# Patient Record
Sex: FEMALE | Race: WHITE | NOT HISPANIC OR LATINO | Employment: FULL TIME | URBAN - METROPOLITAN AREA
[De-identification: names, ages, dates, MRNs, and addresses within clinical notes are randomized per-mention and may not be internally consistent; named-entity substitution may affect disease eponyms.]

---

## 2017-07-11 ENCOUNTER — HOSPITAL ENCOUNTER (EMERGENCY)
Facility: HOSPITAL | Age: 27
Discharge: HOME | End: 2017-07-12
Attending: EMERGENCY MEDICINE
Payer: COMMERCIAL

## 2017-07-11 DIAGNOSIS — O03.9 SPONTANEOUS MISCARRIAGE: ICD-10-CM

## 2017-07-11 DIAGNOSIS — R10.2 ACUTE PELVIC PAIN, FEMALE: Primary | ICD-10-CM

## 2017-07-11 DIAGNOSIS — N83.209 OVARIAN CYST: ICD-10-CM

## 2017-07-11 DIAGNOSIS — N93.9 VAGINAL BLEEDING: ICD-10-CM

## 2017-07-11 LAB
ALBUMIN SERPL BCP-MCNC: 3.8 G/DL
ALP SERPL-CCNC: 41 U/L
ALT SERPL W/O P-5'-P-CCNC: 19 U/L
ANION GAP SERPL CALC-SCNC: 7 MMOL/L
AST SERPL-CCNC: 20 U/L
BASOPHILS # BLD AUTO: 0.01 K/UL
BASOPHILS NFR BLD: 0.2 %
BILIRUB SERPL-MCNC: 0.3 MG/DL
BUN SERPL-MCNC: 10 MG/DL
CALCIUM SERPL-MCNC: 9 MG/DL
CHLORIDE SERPL-SCNC: 106 MMOL/L
CO2 SERPL-SCNC: 25 MMOL/L
CREAT SERPL-MCNC: 0.7 MG/DL
DIFFERENTIAL METHOD: ABNORMAL
EOSINOPHIL # BLD AUTO: 0.1 K/UL
EOSINOPHIL NFR BLD: 0.9 %
ERYTHROCYTE [DISTWIDTH] IN BLOOD BY AUTOMATED COUNT: 12.2 %
EST. GFR  (AFRICAN AMERICAN): >60 ML/MIN/1.73 M^2
EST. GFR  (NON AFRICAN AMERICAN): >60 ML/MIN/1.73 M^2
GLUCOSE SERPL-MCNC: 86 MG/DL
HCT VFR BLD AUTO: 35.3 %
HGB BLD-MCNC: 12.5 G/DL
LYMPHOCYTES # BLD AUTO: 2 K/UL
LYMPHOCYTES NFR BLD: 31.8 %
MCH RBC QN AUTO: 33.1 PG
MCHC RBC AUTO-ENTMCNC: 35.4 %
MCV RBC AUTO: 93 FL
MONOCYTES # BLD AUTO: 0.3 K/UL
MONOCYTES NFR BLD: 5.4 %
NEUTROPHILS # BLD AUTO: 3.9 K/UL
NEUTROPHILS NFR BLD: 61.7 %
PLATELET # BLD AUTO: 150 K/UL
PMV BLD AUTO: 10.7 FL
POTASSIUM SERPL-SCNC: 3.7 MMOL/L
PROT SERPL-MCNC: 6.8 G/DL
RBC # BLD AUTO: 3.78 M/UL
SODIUM SERPL-SCNC: 138 MMOL/L
WBC # BLD AUTO: 6.35 K/UL

## 2017-07-11 PROCEDURE — 85025 COMPLETE CBC W/AUTO DIFF WBC: CPT

## 2017-07-11 PROCEDURE — 84702 CHORIONIC GONADOTROPIN TEST: CPT

## 2017-07-11 PROCEDURE — 25000003 PHARM REV CODE 250: Performed by: NURSE PRACTITIONER

## 2017-07-11 PROCEDURE — 96365 THER/PROPH/DIAG IV INF INIT: CPT

## 2017-07-11 PROCEDURE — 96375 TX/PRO/DX INJ NEW DRUG ADDON: CPT

## 2017-07-11 PROCEDURE — 80053 COMPREHEN METABOLIC PANEL: CPT

## 2017-07-11 PROCEDURE — 63600175 PHARM REV CODE 636 W HCPCS: Performed by: NURSE PRACTITIONER

## 2017-07-11 PROCEDURE — 99285 EMERGENCY DEPT VISIT HI MDM: CPT | Mod: 25

## 2017-07-11 RX ORDER — SODIUM CHLORIDE 9 MG/ML
125 INJECTION, SOLUTION INTRAVENOUS CONTINUOUS
Status: DISCONTINUED | OUTPATIENT
Start: 2017-07-11 | End: 2017-07-11

## 2017-07-11 RX ORDER — MORPHINE SULFATE 2 MG/ML
2 INJECTION, SOLUTION INTRAMUSCULAR; INTRAVENOUS
Status: COMPLETED | OUTPATIENT
Start: 2017-07-11 | End: 2017-07-11

## 2017-07-11 RX ADMIN — MORPHINE SULFATE 2 MG: 2 INJECTION, SOLUTION INTRAMUSCULAR; INTRAVENOUS at 10:07

## 2017-07-11 RX ADMIN — PROMETHAZINE HYDROCHLORIDE 25 MG: 25 INJECTION INTRAMUSCULAR; INTRAVENOUS at 10:07

## 2017-07-12 VITALS
RESPIRATION RATE: 18 BRPM | TEMPERATURE: 98 F | HEIGHT: 65 IN | HEART RATE: 61 BPM | BODY MASS INDEX: 21.33 KG/M2 | OXYGEN SATURATION: 99 % | SYSTOLIC BLOOD PRESSURE: 101 MMHG | DIASTOLIC BLOOD PRESSURE: 79 MMHG | WEIGHT: 128 LBS

## 2017-07-12 LAB — HCG INTACT+B SERPL-ACNC: NORMAL MIU/ML

## 2017-07-12 RX ORDER — OXYCODONE AND ACETAMINOPHEN 5; 325 MG/1; MG/1
1 TABLET ORAL EVERY 4 HOURS PRN
Qty: 18 TABLET | Refills: 0 | Status: SHIPPED | OUTPATIENT
Start: 2017-07-12 | End: 2021-10-27

## 2017-07-12 RX ORDER — PROMETHAZINE HYDROCHLORIDE 25 MG/1
25 TABLET ORAL EVERY 6 HOURS PRN
Qty: 15 TABLET | Refills: 0 | Status: SHIPPED | OUTPATIENT
Start: 2017-07-12 | End: 2021-10-27

## 2017-07-12 NOTE — ED NOTES
The patient is resting quietly, eyes closed, arouses easily to stimuli. Airway is open and patent, respirations are spontaneous, normal respiratory effort and rate noted, skin warm and dry, appearance: in no acute distress and resting comfortably. Reports no complaints of pain or n/v at this time. VSS

## 2017-07-12 NOTE — ED PROVIDER NOTES
SCRIBE #1 NOTE: I, Susana Wilson, am scribing for, and in the presence of, Skyler Willis NP. I have scribed the entire note.      History      Chief Complaint   Patient presents with    Abdominal Pain     reports currently 8 weeks pregnant reports having ultrasound completed on Friday and was notified she had a large right ovarian cyst, also had + IUP, reports increased pain today with slight vaginal bleeding        Review of patient's allergies indicates:  No Known Allergies     HPI   HPI    7/11/2017, 10:12 PM   History obtained from the patient      History of Present Illness: Kelly Neal is a 26 y.o. female patient (8 weeks gestation) who presents to the Emergency Department for lower abd pain which onset gradually today. Symptoms are constant and moderate in severity. Pt describes pain as a pressure. Pt states that she currently has a 6 cm ovarian cyst. No mitigating or exacerbating factors reported. Associated sxs include vaginal bleeding. Patient denies any fever, n/v/d, constipation, vaginal pain, vaginal discharge, pelvic pain, flank pain, dysuria, hematuria, back pain, CP, SOB, HA, numbness, weakness, and all other sxs at this time. Prior Tx includes tylenol with no relief. No further complaints or concerns at this time.       Arrival mode: Personal vehicle    PCP: Raymundo Adler MD       Past Medical History:  Past medical history reviewed not relevant      Past Surgical History:  Past surgical history reviewed not relevant      Family History:  Family history reviewed not relevant      Social History:  Social History reviewed not relevant    Social History Main Topics    Social History Main Topics    Smoking status: Unknown if ever smoked    Smokeless tobacco: Unknown if ever used    Alcohol Use: Unknown drinking history    Drug Use: Unknown if ever used    Sexual Activity: Unknown       ROS   Review of Systems   Constitutional: Negative for fever.   HENT: Negative for sore throat.     Respiratory: Negative for shortness of breath.    Cardiovascular: Negative for chest pain.   Gastrointestinal: Positive for abdominal pain (lower). Negative for abdominal distention, blood in stool, constipation, diarrhea, nausea and vomiting.   Genitourinary: Positive for vaginal bleeding. Negative for decreased urine volume, difficulty urinating, dysuria, flank pain, hematuria, pelvic pain, vaginal discharge and vaginal pain.   Musculoskeletal: Negative for back pain.   Skin: Negative for rash.   Neurological: Negative for dizziness, weakness, light-headedness, numbness and headaches.   Hematological: Does not bruise/bleed easily.       Physical Exam      Initial Vitals [07/11/17 2201]   BP Pulse Resp Temp SpO2   128/69 66 18 98.1 °F (36.7 °C) 96 %      MAP       88.67          Physical Exam  Nursing Notes and Vital Signs Reviewed.  Constitutional: Patient is in no acute distress. Well-developed and well-nourished.  Head: Atraumatic. Normocephalic.  Eyes: PERRL. EOM intact. Conjunctivae are not pale. No scleral icterus.  ENT: Mucous membranes are moist. Oropharynx is clear and symmetric.    Neck: Supple. Full ROM. No lymphadenopathy.  Cardiovascular: Regular rate. Regular rhythm. No murmurs, rubs, or gallops. Distal pulses are 2+ and symmetric.  Pulmonary/Chest: No respiratory distress. Clear to auscultation bilaterally. No wheezing, rales, or rhonchi.  Abdominal: Soft and non-distended.  Right lower pelvic tenderness.  No rebound, guarding, or rigidity. Good bowel sounds.  Genitourinary: No CVA tenderness  Musculoskeletal: Moves all extremities. No obvious deformities. No edema. No calf tenderness.  Skin: Warm and dry.  Neurological:  Alert, awake, and appropriate.  Normal speech.  No acute focal neurological deficits are appreciated.  Psychiatric: Normal affect. Good eye contact. Appropriate in content.    ED Course    Procedures  ED Vital Signs:  Vitals:    07/11/17 2201 07/11/17 2345   BP: 128/69 101/79  "  Pulse: 66 61   Resp: 18 18   Temp: 98.1 °F (36.7 °C)    TempSrc: Oral    SpO2: 96% 99%   Weight: 58.1 kg (128 lb)    Height: 5' 5" (1.651 m)        Abnormal Lab Results:  Labs Reviewed   CBC W/ AUTO DIFFERENTIAL - Abnormal; Notable for the following:        Result Value    RBC 3.78 (*)     Hematocrit 35.3 (*)     MCH 33.1 (*)     All other components within normal limits   COMPREHENSIVE METABOLIC PANEL - Abnormal; Notable for the following:     Alkaline Phosphatase 41 (*)     Anion Gap 7 (*)     All other components within normal limits   HCG, QUANTITATIVE, PREGNANCY        All Lab Results:  Results for orders placed or performed during the hospital encounter of 07/11/17   CBC W/ AUTO DIFFERENTIAL   Result Value Ref Range    WBC 6.35 3.90 - 12.70 K/uL    RBC 3.78 (L) 4.00 - 5.40 M/uL    Hemoglobin 12.5 12.0 - 16.0 g/dL    Hematocrit 35.3 (L) 37.0 - 48.5 %    MCV 93 82 - 98 fL    MCH 33.1 (H) 27.0 - 31.0 pg    MCHC 35.4 32.0 - 36.0 %    RDW 12.2 11.5 - 14.5 %    Platelets 150 150 - 350 K/uL    MPV 10.7 9.2 - 12.9 fL    Gran # 3.9 1.8 - 7.7 K/uL    Lymph # 2.0 1.0 - 4.8 K/uL    Mono # 0.3 0.3 - 1.0 K/uL    Eos # 0.1 0.0 - 0.5 K/uL    Baso # 0.01 0.00 - 0.20 K/uL    Gran% 61.7 38.0 - 73.0 %    Lymph% 31.8 18.0 - 48.0 %    Mono% 5.4 4.0 - 15.0 %    Eosinophil% 0.9 0.0 - 8.0 %    Basophil% 0.2 0.0 - 1.9 %    Differential Method Automated    Comp. Metabolic Panel   Result Value Ref Range    Sodium 138 136 - 145 mmol/L    Potassium 3.7 3.5 - 5.1 mmol/L    Chloride 106 95 - 110 mmol/L    CO2 25 23 - 29 mmol/L    Glucose 86 70 - 110 mg/dL    BUN, Bld 10 6 - 20 mg/dL    Creatinine 0.7 0.5 - 1.4 mg/dL    Calcium 9.0 8.7 - 10.5 mg/dL    Total Protein 6.8 6.0 - 8.4 g/dL    Albumin 3.8 3.5 - 5.2 g/dL    Total Bilirubin 0.3 0.1 - 1.0 mg/dL    Alkaline Phosphatase 41 (L) 55 - 135 U/L    AST 20 10 - 40 U/L    ALT 19 10 - 44 U/L    Anion Gap 7 (L) 8 - 16 mmol/L    eGFR if African American >60 >60 mL/min/1.73 m^2    eGFR if non " African American >60 >60 mL/min/1.73 m^2   hCG, quantitative   Result Value Ref Range    hCG Quant 09425 See Text mIU/mL         Imaging Results:  Imaging Results          US OB Less Than 14 Wks with Transvag(xpd (Final result)  Result time 07/11/17 23:12:20    Final result by Maxx Tobar MD (07/11/17 23:12:20)                 Impression:      Intrauterine gestational sac with fetal pole and yolk sac.  However no fetal cardiac activity identified.  Probable fetal demise.  Followup suggested.    Right adnexal cyst.      Electronically signed by: MAXX TOBAR MD  Date:     07/11/17  Time:    23:12              Narrative:    Exam: US OB LESS THAN 14 WKS WITH TRANSVAGINAL (XPD),    Date:  07/11/17 22:20:00    History: Vag Bleeding    Comparison:  No prior relevant studies available    Findings: Transabdominal and endovaginal ultrasound was performed.    An intrauterine gestational sac with yolk sac and fetal pole are seen.  Based on crown-rump length age is 7 weeks 2 days.  However no documented cardiac activity is identified.  Probable fetal demise.    The left ovary is normal at 2.2 x 1.0 x 1.8 cm.  The right ovary is enlarged at 6.3 x 4.5 0.8 cm.  Large simple 5.8 x 4.6 x 5.4 cm cyst is present.  Normal blood flow bilaterally.    Small amount of free fluid in the cul-de-sac.    Report called to ER physician Dr. Willis at 2312 hrs.  Findings observed at 2305 hrs.                                      The Emergency Provider reviewed the vital signs and test results, which are outlined above.    ED Discussion     Consult: I discussed the case with Dr. Yu (OBGYN). Dr. Yu recommends counseling concerning cyst and miscarriage, Discharge with pain and nausea medications. Follow up in clinic.     I discussed with patient and/or family/caretaker that evaluation in the ED does not suggest any emergent or life threatening medical conditions requiring immediate intervention beyond what was provided in the  ED, and I believe patient is safe for discharge. Regardless, an unremarkable evaluation in the ED does not preclude the development or presence of a serious of life threatening condition. As such, patient was instructed to return immediately for any worsening or change in current symptoms.     Reevaluation: The patient feels better and is resting comfortably. Pt states symptoms are improved. Discussed test results, shared treatment plan, specific conditions for return, and the importance of follow up. Pt feels comfortable with the plan as discussed. Answered questions at this time. Patient has remained hemodynamically stable throughout ED course and is stable for discharge.     ED Medication(s):  Medications   sodium chloride 0.9% bolus 1,000 mL (1,000 mLs Intravenous Not Given 7/11/17 2215)   morphine injection 2 mg (2 mg Intravenous Given 7/11/17 2255)   promethazine (PHENERGAN) 25 mg in dextrose 5 % 50 mL IVPB (0 mg Intravenous Stopped 7/11/17 9409)       Discharge Medication List as of 7/12/2017 12:16 AM      START taking these medications    Details   oxycodone-acetaminophen (PERCOCET) 5-325 mg per tablet Take 1 tablet by mouth every 4 (four) hours as needed for Pain., Starting Wed 7/12/2017, Print      promethazine (PHENERGAN) 25 MG tablet Take 1 tablet (25 mg total) by mouth every 6 (six) hours as needed for Nausea., Starting Wed 7/12/2017, Print             Follow-up Information     Schedule an appointment as soon as possible for a visit  with OBGYN.           [facility] - .    Specialty:  Emergency Medicine  Why:  As needed, If symptoms worsen  Contact information:  [id number] [facility] [address] [phone]                   Medical Decision Making              Scribe Attestation:   Scribe #1: I performed the above scribed service and the documentation accurately describes the services I performed. I attest to the accuracy of the note.    Attending:   Physician  Attestation Statement for Scribe #1: I, Skyler Willis NP, personally performed the services described in this documentation, as scribed by Susana Wilson, in my presence, and it is both accurate and complete.          Clinical Impression       ICD-10-CM ICD-9-CM   1. Acute pelvic pain, female R10.2 625.9   2. Spontaneous miscarriage O03.9 634.90   3. Vaginal bleeding N93.9 623.8   4. Ovarian cyst N83.209 620.2       Disposition:   Disposition: Discharged  Condition: Stable         Skyler Willis NP  07/12/17 0126       Skyler Willis NP  07/12/17 0131

## 2021-01-12 ENCOUNTER — TELEPHONE (OUTPATIENT)
Dept: NEUROLOGY | Facility: CLINIC | Age: 31
End: 2021-01-12

## 2021-01-14 ENCOUNTER — TELEPHONE (OUTPATIENT)
Dept: NEUROLOGY | Facility: CLINIC | Age: 31
End: 2021-01-14